# Patient Record
Sex: FEMALE | Race: WHITE | URBAN - METROPOLITAN AREA
[De-identification: names, ages, dates, MRNs, and addresses within clinical notes are randomized per-mention and may not be internally consistent; named-entity substitution may affect disease eponyms.]

---

## 2020-01-01 ENCOUNTER — TELEPHONE (OUTPATIENT)
Dept: INTERNAL MEDICINE CLINIC | Age: 40
End: 2020-01-01

## 2020-01-01 ENCOUNTER — OFFICE VISIT (OUTPATIENT)
Dept: INTERNAL MEDICINE CLINIC | Age: 40
End: 2020-01-01
Payer: COMMERCIAL

## 2020-01-01 ENCOUNTER — HOSPITAL ENCOUNTER (OUTPATIENT)
Age: 40
Setting detail: SPECIMEN
Discharge: HOME | End: 2020-11-24
Payer: COMMERCIAL

## 2020-01-01 ENCOUNTER — OFFICE VISIT (OUTPATIENT)
Dept: PRIMARY CARE CLINIC | Age: 40
End: 2020-01-01
Payer: COMMERCIAL

## 2020-01-01 VITALS
BODY MASS INDEX: 38.2 KG/M2 | WEIGHT: 215.6 LBS | DIASTOLIC BLOOD PRESSURE: 96 MMHG | HEART RATE: 111 BPM | SYSTOLIC BLOOD PRESSURE: 126 MMHG | TEMPERATURE: 98.2 F | HEIGHT: 63 IN | OXYGEN SATURATION: 98 %

## 2020-01-01 VITALS — OXYGEN SATURATION: 97 % | TEMPERATURE: 97.1 F | HEART RATE: 109 BPM | RESPIRATION RATE: 17 BRPM

## 2020-01-01 LAB
SARS-COV-2: NOT DETECTED
SOURCE: NORMAL

## 2020-01-01 PROCEDURE — 99213 OFFICE O/P EST LOW 20 MIN: CPT | Performed by: NURSE PRACTITIONER

## 2020-01-01 PROCEDURE — 99385 PREV VISIT NEW AGE 18-39: CPT | Performed by: FAMILY MEDICINE

## 2020-01-01 PROCEDURE — U0002 COVID-19 LAB TEST NON-CDC: HCPCS

## 2020-01-01 RX ORDER — ESCITALOPRAM OXALATE 10 MG/1
TABLET ORAL
Qty: 30 TABLET | Refills: 1 | Status: SHIPPED | OUTPATIENT
Start: 2020-01-01 | End: 2020-01-01 | Stop reason: SDUPTHER

## 2020-01-01 RX ORDER — DESOGESTREL AND ETHINYL ESTRADIOL 0.15-0.03
1 KIT ORAL DAILY
COMMUNITY
Start: 2020-01-01

## 2020-01-01 RX ORDER — ESCITALOPRAM OXALATE 10 MG/1
10 TABLET ORAL DAILY
Qty: 30 TABLET | Refills: 1 | Status: SHIPPED | OUTPATIENT
Start: 2020-01-01 | End: 2020-01-01

## 2020-01-01 RX ORDER — ESCITALOPRAM OXALATE 10 MG/1
TABLET ORAL
Qty: 90 TABLET | Refills: 0 | Status: SHIPPED | OUTPATIENT
Start: 2020-01-01 | End: 2021-01-01 | Stop reason: SDUPTHER

## 2020-01-01 ASSESSMENT — ENCOUNTER SYMPTOMS
BLOOD IN STOOL: 0
EYES NEGATIVE: 1
WHEEZING: 0
BACK PAIN: 0
SHORTNESS OF BREATH: 0
COUGH: 0
DIARRHEA: 0
NAUSEA: 0
CHEST TIGHTNESS: 0
ABDOMINAL PAIN: 0
CONSTIPATION: 0

## 2020-01-01 ASSESSMENT — PATIENT HEALTH QUESTIONNAIRE - PHQ9
1. LITTLE INTEREST OR PLEASURE IN DOING THINGS: 0
SUM OF ALL RESPONSES TO PHQ9 QUESTIONS 1 & 2: 0
2. FEELING DOWN, DEPRESSED OR HOPELESS: 0
SUM OF ALL RESPONSES TO PHQ QUESTIONS 1-9: 0
SUM OF ALL RESPONSES TO PHQ QUESTIONS 1-9: 0

## 2020-09-24 NOTE — PROGRESS NOTES
Jonna Schilder  1980  47 y.o.  female    Chief Complaint   Patient presents with    New Patient    Annual Exam         History of Present Illness  Carol Pope is a 47 y.o. female who presents today for new patient visit/annual exam. She has not seen a PCP in a long time. She c/o of anxiety. She states had her thyroid check in the past, but she would like to recheck labs. She has chronic worry about her children. She states her heart can race with anxiety. She monitors with her Apple watch. Anxiety symptoms for 2 years. Denies depression. She has felt tied and has gained weight. Recent rash to her RUE that is improving as the day goes on. No Cp or Sob. Review of Systems   Constitutional: Negative for chills, diaphoresis and fever. HENT: Negative. Eyes: Negative. Respiratory: Negative for cough, chest tightness, shortness of breath and wheezing. Cardiovascular: Negative for chest pain and palpitations. Gastrointestinal: Negative for abdominal pain, blood in stool, constipation, diarrhea and nausea. Genitourinary: Negative for difficulty urinating and dysuria. Musculoskeletal: Positive for arthralgias (feet). Negative for back pain and neck pain. Skin: Positive for rash. Neurological: Negative for dizziness, light-headedness and headaches.    Psychiatric/Behavioral:        Anxiety       No Known Allergies    Past Medical History:   Diagnosis Date    Anxiety        Past Surgical History:   Procedure Laterality Date    FOOT SURGERY Left     bunion       Family History   Adopted: Yes   Problem Relation Age of Onset    Glaucoma Mother     Diabetes Father        Social History     Tobacco Use    Smoking status: Former Smoker     Packs/day: 0.25     Years: 6.00     Pack years: 1.50     Types: Cigarettes     Start date: 1996     Last attempt to quit: 2002     Years since quittin.7    Smokeless tobacco: Never Used   Substance Use Topics    Alcohol use: No    Drug use: No       Current Outpatient Medications   Medication Sig Dispense Refill    escitalopram (LEXAPRO) 10 MG tablet Take 1 tablet by mouth daily 30 tablet 1    APRI 0.15-30 MG-MCG per tablet Take 1 tablet by mouth daily       No current facility-administered medications for this visit. OBJECTIVE:    BP (!) 126/96   Pulse 111   Temp 98.2 °F (36.8 °C)   Ht 5' 3\" (1.6 m)   Wt 215 lb 9.6 oz (97.8 kg)   SpO2 98%   BMI 38.19 kg/m²  Pulse recheck 99    Physical Exam  Vitals signs reviewed. Constitutional:       General: She is not in acute distress. Appearance: She is well-developed. HENT:      Right Ear: Tympanic membrane normal.      Left Ear: Tympanic membrane normal.      Nose: Nose normal.      Mouth/Throat:      Mouth: Mucous membranes are moist.   Eyes:      Extraocular Movements: Extraocular movements intact. Pupils: Pupils are equal, round, and reactive to light. Neck:      Musculoskeletal: Neck supple. Cardiovascular:      Rate and Rhythm: Normal rate and regular rhythm. Pulmonary:      Effort: Pulmonary effort is normal. No respiratory distress. Breath sounds: Normal breath sounds. Abdominal:      General: Bowel sounds are normal. There is no distension. Palpations: Abdomen is soft. Musculoskeletal: Normal range of motion. Right lower leg: No edema. Left lower leg: No edema. Skin:     Comments: Urticaria to  RUE   Neurological:      Mental Status: She is alert and oriented to person, place, and time. Cranial Nerves: No cranial nerve deficit. Psychiatric:         Mood and Affect: Mood normal.         ASSESSMENT:  1. Annual physical exam    2. Anxiety    3. Fatigue, unspecified type    4.  Urticaria        PLAN:    Orders Placed This Encounter   Procedures    CBC Auto Differential    Comprehensive Metabolic Panel    TSH WITH REFLEX TO FT4       Orders Placed This Encounter   Medications    escitalopram (LEXAPRO) 10 MG tablet     Sig: Take 1 tablet by mouth daily     Dispense:  30 tablet     Refill:  3   Obtain old records  Obtain labs  Start Lexapro  ADR's explained  Use Cortisone 10 bid to affected areas on extremities for 1-2 weeks if needed  Keep f/u with GYN for pap  The patient is asked to make an attempt to improve diet and exercise patterns to aid in medical management of this problem. Persist RTO or call           Return in about 2 months (around 11/24/2020) for anxiety.     Electronically Signed by Osmar Osman DO

## 2020-10-03 PROBLEM — F41.9 ANXIETY: Status: ACTIVE | Noted: 2020-01-01

## 2020-10-09 NOTE — TELEPHONE ENCOUNTER
Spoke to pt. Mail lab orders placed today  . Pt seen at urgent care for increased hives--treated with steroid rosanna and improved. Stable on  Lexapro.  She will continue to monitor HR and let me know if any problems

## 2020-11-18 NOTE — TELEPHONE ENCOUNTER
Pt called in stating she was having issues with hives, lip swelling, and tongue swelling. Returned pt call and advised to go to urgent care as the doctor would want to see her and does not have any openings at this time. Pt voiced understanding.

## 2020-11-23 NOTE — TELEPHONE ENCOUNTER
Is under quarantine and cannot get her in till 1/4, said medication is working for her.  Please call

## 2020-11-24 NOTE — PATIENT INSTRUCTIONS
Your COVID 19 test can take 3-5 days for the results come back. We ask that you make a Mychart page and view your test results this way. You will need to Self quarantine until you know your results. Increase fluids rest  Saline nasal spray as directed  Warm salt gargles for throat discomfort  Monitor temperature twice a day  Tylenol for fevers and/or discomfort. If symptoms are worse -Go to the ER. Follow up with your primary doctor    To Whom it May Concern:    Dash Garber has been tested for COVID on 11/24/20. They may NOT return to work until their lab test results back and they been fever free for 3 days. If test is positive they must stay home for 2 weeks or until they test negative or as directed by the [facility] Department. Steps to help prevent the spread of COVID-19 if you are sick  SOURCE - https://yurialive.cnlombardi.info/. html     Stay home except to get medical care   ; Stay home: People who are mildly ill with COVID-19 are able to isolate at home during their illness. You should restrict activities outside your home, except for getting medical care.   ; Avoid public areas: Do not go to work, school, or public areas.   ; Avoid public transportation: Avoid using public transportation, ride-sharing, or taxis.  ; Separate yourself from other people and animals in your home   ; Stay away from others: As much as possible, you should stay in a specific room and away from other people in your home. Also, you should use a separate bathroom, if available.   ; Limit contact with pets & animals: You should restrict contact with pets and other animals while you are sick with COVID-19, just like you would around other people. Although there have not been reports of pets or other animals becoming sick with COVID-19, it is still recommended that people sick with COVID-19 limit contact with animals until more information is known about the virus.    ; When possible, have another member of your household care for your animals while you are sick. If you are sick with COVID-19, avoid contact with your pet, including petting, snuggling, being kissed or licked, and sharing food. If you must care for your pet or be around animals while you are sick, wash your hands before and after you interact with pets and wear a facemask. See COVID-19 and Animals for more information. Other considerations   The ill person should eat/be fed in their room if possible. Non-disposable  items used should be handled with gloves and washed with hot water or in a . Clean hands after handling used  items.  If possible, dedicate a lined trash can for the ill person. Use gloves when removing garbage bags, handling, and disposing of trash. Wash hands after handling or disposing of trash.  Consider consulting with your local health department about trash disposal guidance if available. Information for Household Members and Caregivers of Someone who is Sick   Call ahead before visiting your doctor   Call ahead: If you have a medical appointment, call the healthcare provider and tell them that you have or may have COVID-19. This will help the healthcare provider's office take steps to keep other people from getting infected or exposed. Wear a facemask if you are sick   ; If you are sick: You should wear a facemask when you are around other people (e.g., sharing a room or vehicle) or pets and before you enter a healthcare provider's office. ; If you are caring for others: If the person who is sick is not able to wear a facemask (for example, because it causes trouble breathing), then people who live with the person who is sick should not stay in the same room with them, or they should wear a facemask if they enter a room with the person who is sick. Cover your coughs and sneezes   ;  Cover: Cover your mouth and nose with a tissue when you cough or fluids: Also, clean any surfaces that may have blood, stool, or body fluids on them.   ; Household : Use a household cleaning spray or wipe, according to the label instructions. Labels contain instructions for safe and effective use of the cleaning product including precautions you should take when applying the product, such as wearing gloves and making sure you have good ventilation during use of the product. Monitor your symptoms   Seek medical attention: Seek prompt medical attention if your illness is worsening     (e.g., difficulty breathing).   ; Call your doctor: Before seeking care, call your healthcare provider and tell them that you have, or are being evaluated for, COVID-19.   ; Wear a facemask when sick: Put on a facemask before you enter the facility. These steps will help the healthcare provider's office to keep other people in the office or waiting room from getting infected or exposed. ; Alert health department: Ask your healthcare provider to call the local or state health department. Persons who are placed under active monitoring or facilitated self-monitoring should follow instructions provided by their local health department or occupational health professionals, as appropriate.  ; Call 911 if you have a medical emergency: If you have a medical emergency and need to call 911, notify the dispatch personnel that you have, or are being evaluated for COVID-19. If possible, put on a facemask before emergency medical services arrive.

## 2020-11-24 NOTE — PROGRESS NOTES
11/24/2020    HPI:  Chief complaint and history of present illness as per medical assistant/nurse documented today in the Flu/COVID-19 clinic. She is being seen today for a 3 day history of productive cough, nasal congestion, headache, and no sense of taste or smell. Denies sob, difficulty breathing, dizziness, weakness, and no acute distress noted on exam.  Her 23year old son who lives in her home has tested positive and is symptomatic with COVID-19. MEDICATIONS:  Prior to Visit Medications    Medication Sig Taking? Authorizing Provider   escitalopram (LEXAPRO) 10 MG tablet TAKE 1 TABLET BY MOUTH EVERY DAY  Alba Ortiz DO   APRI 0.15-30 MG-MCG per tablet Take 1 tablet by mouth daily  Historical Provider, MD           PHYSICAL EXAM:  Physical Exam  Vitals signs and nursing note reviewed. Constitutional:       General: She is not in acute distress. Appearance: Normal appearance. She is well-developed. She is not ill-appearing, toxic-appearing or diaphoretic. HENT:      Head: Normocephalic and atraumatic. Right Ear: Tympanic membrane, ear canal and external ear normal.      Left Ear: Tympanic membrane, ear canal and external ear normal.      Nose: Congestion present. Mouth/Throat:      Mouth: Mucous membranes are moist.      Pharynx: No oropharyngeal exudate or posterior oropharyngeal erythema. Eyes:      Extraocular Movements: Extraocular movements intact. Conjunctiva/sclera: Conjunctivae normal.      Pupils: Pupils are equal, round, and reactive to light. Neck:      Musculoskeletal: Full passive range of motion without pain, normal range of motion and neck supple. No neck rigidity or muscular tenderness. Thyroid: No thyroid mass or thyromegaly. Trachea: Trachea normal.   Cardiovascular:      Rate and Rhythm: Normal rate and regular rhythm. Pulses: Normal pulses.       Heart sounds: S1 normal and S2 normal.   Pulmonary:      Effort: Pulmonary effort is normal. No accessory muscle usage or respiratory distress. Breath sounds: Normal breath sounds. No stridor. No wheezing, rhonchi or rales. Musculoskeletal: Normal range of motion. Right shoulder: She exhibits normal range of motion, no tenderness, no bony tenderness, no swelling, no crepitus, no deformity and no pain. Lymphadenopathy:      Cervical: No cervical adenopathy. Skin:     General: Skin is warm and dry. Coloration: Skin is not pale. Findings: No erythema or rash. Nails: There is no clubbing. Neurological:      Mental Status: She is alert and oriented to person, place, and time. Psychiatric:         Mood and Affect: Mood normal.         Speech: Speech normal.         Behavior: Behavior normal.         Thought Content: Thought content normal.         Judgment: Judgment normal.         ASSESSMENT/PLAN:  1. Chest congestion    - COVID-19 Ambulatory      FOLLOW-UP:  Return if symptoms worsen or fail to improve. Your COVID 19 test can take 3-5 days for the results come back. We ask that you make a Mychart page and view your test results this way. You will need to Self quarantine until you know your results. Increase fluids rest  Saline nasal spray as directed  Warm salt gargles for throat discomfort  Monitor temperature twice a day  Tylenol for fevers and/or discomfort. If symptoms are worse -Go to the ER. Follow up with your primary doctor    To Whom it May Concern:    Mu Posada has been tested for COVID on 11/24/20. They may NOT return to work until their lab test results back and they been fever free for 3 days. If test is positive they must stay home for 2 weeks or until they test negative or as directed by the [facility] Department.    In addition to other information, the printed after visit summary provided to the patient includes:  [x] COVID-19 Self care instructions  [x] COVID-19 General patient information

## 2020-11-24 NOTE — PROGRESS NOTES
11/24/20  Carol Cleveland  1980    FLU/COVID-19 CLINIC EVALUATION    HPI SYMPTOMS:Son + Covid  Employer: [employer]    [] Fevers  [] Chills  [x] Cough  [] Coughing up blood  [] Chest Congestion  [x] Nasal Congestion  [] Feeling short of breath  [] Sometimes  [] Frequently  [] All the time  [] Chest pain  [x] Headaches  []Tolerable  [x] Severe  [] Sore throat  [] Muscle aches  [] Nausea  [] Vomiting  []Unable to keep fluids down  [x] Diarrhea  []Severe    [] OTHER SYMPTOMS:      Symptom Duration:   [] 1  [] 2   [x] 3   [] 4    [] 5   [] 6   [] 7   [] 8   [] 9   [] 10   [] 11   [] 12   [] 13   [] 14   [] Longer than 14 days    Symptom course:   [] Worsening     [x] Stable     [] Improving    RISK FACTORS:    [] Pregnant or possibly pregnant  [] Age over 61  [] Diabetes  [] Heart disease  [] Asthma  [] COPD/Other chronic lung diseases  [] Active Cancer  [] On Chemotherapy  [] Taking oral steroids  [] History Lymphoma/Leukemia  [x] Close contact with a lab confirmed COVID-19 patient within 14 days of symptom onset  [] History of travel from affected geographical areas within 14 days of symptom onset       VITALS:  There were no vitals filed for this visit. TESTS:    POCT FLU:  [] Positive     []Negative    ASSESSMENT:    [] Flu  [] Possible COVID-19  [] Strep    PLAN:    [] Discharge home with written instructions for:  [] Flu management  [] Possible COVID-19 infection with self-quarantine and management of symptoms  [] Follow-up with primary care physician or emergency department if worsens  [] Evaluation per physician/NP/PA in clinic  [] Sent to ER       An  electronic signature was used to authenticate this note.      --Carmita Rangel LPN on 13/57/1380 at 11:12 AM

## 2021-01-01 ENCOUNTER — INITIAL CONSULT (OUTPATIENT)
Dept: ONCOLOGY | Age: 41
End: 2021-01-01

## 2021-01-01 ENCOUNTER — PATIENT MESSAGE (OUTPATIENT)
Dept: INTERNAL MEDICINE CLINIC | Age: 41
End: 2021-01-01

## 2021-01-01 ENCOUNTER — HOSPITAL ENCOUNTER (EMERGENCY)
Age: 41
End: 2021-05-23
Attending: EMERGENCY MEDICINE
Payer: COMMERCIAL

## 2021-01-01 ENCOUNTER — OFFICE VISIT (OUTPATIENT)
Dept: INTERNAL MEDICINE CLINIC | Age: 41
End: 2021-01-01
Payer: COMMERCIAL

## 2021-01-01 ENCOUNTER — OFFICE VISIT (OUTPATIENT)
Dept: ONCOLOGY | Age: 41
End: 2021-01-01

## 2021-01-01 ENCOUNTER — HOSPITAL ENCOUNTER (OUTPATIENT)
Dept: INFUSION THERAPY | Age: 41
Discharge: HOME | End: 2021-04-06
Payer: COMMERCIAL

## 2021-01-01 ENCOUNTER — HOSPITAL ENCOUNTER (OUTPATIENT)
Dept: INFUSION THERAPY | Age: 41
Discharge: HOME | End: 2021-04-22
Payer: COMMERCIAL

## 2021-01-01 VITALS
HEART RATE: 114 BPM | DIASTOLIC BLOOD PRESSURE: 92 MMHG | TEMPERATURE: 98.5 F | WEIGHT: 221.8 LBS | SYSTOLIC BLOOD PRESSURE: 162 MMHG | OXYGEN SATURATION: 94 % | HEIGHT: 63 IN | RESPIRATION RATE: 18 BRPM | BODY MASS INDEX: 39.3 KG/M2

## 2021-01-01 VITALS
OXYGEN SATURATION: 99 % | HEART RATE: 92 BPM | DIASTOLIC BLOOD PRESSURE: 88 MMHG | TEMPERATURE: 97.1 F | SYSTOLIC BLOOD PRESSURE: 132 MMHG | BODY MASS INDEX: 38.62 KG/M2 | WEIGHT: 218 LBS

## 2021-01-01 VITALS — DIASTOLIC BLOOD PRESSURE: 39 MMHG | RESPIRATION RATE: 102 BRPM | HEART RATE: 46 BPM | SYSTOLIC BLOOD PRESSURE: 57 MMHG

## 2021-01-01 VITALS
HEART RATE: 103 BPM | BODY MASS INDEX: 39.23 KG/M2 | DIASTOLIC BLOOD PRESSURE: 76 MMHG | SYSTOLIC BLOOD PRESSURE: 152 MMHG | WEIGHT: 221.4 LBS | TEMPERATURE: 98.4 F | OXYGEN SATURATION: 100 % | HEIGHT: 63 IN

## 2021-01-01 DIAGNOSIS — R22.0 LIP SWELLING: ICD-10-CM

## 2021-01-01 DIAGNOSIS — D72.829 LEUKOCYTOSIS, UNSPECIFIED TYPE: Primary | ICD-10-CM

## 2021-01-01 DIAGNOSIS — D72.829 LEUKOCYTOSIS, UNSPECIFIED TYPE: ICD-10-CM

## 2021-01-01 DIAGNOSIS — L50.9 URTICARIA: ICD-10-CM

## 2021-01-01 DIAGNOSIS — I46.9 CARDIAC ARREST (HCC): Primary | ICD-10-CM

## 2021-01-01 DIAGNOSIS — F41.9 ANXIETY: Primary | ICD-10-CM

## 2021-01-01 LAB
DIFFERENTIAL TYPE: ABNORMAL
FERRITIN: 62 NG/ML (ref 15–150)
HCT VFR BLD CALC: 37.3 % (ref 37–47)
HEMOGLOBIN: 12.4 GM/DL (ref 12.5–16)
IRON: 56 UG/DL (ref 37–145)
LYMPHOCYTES ABSOLUTE: 2.7 K/CU MM
LYMPHOCYTES RELATIVE PERCENT: 20 % (ref 24–44)
MCH RBC QN AUTO: 28.3 PG (ref 27–31)
MCHC RBC AUTO-ENTMCNC: 33.2 % (ref 32–36)
MCV RBC AUTO: 85.2 FL (ref 78–100)
MONOCYTES ABSOLUTE: 0.9 K/CU MM
MONOCYTES RELATIVE PERCENT: 7 % (ref 0–4)
PCT TRANSFERRIN: 14 % (ref 10–44)
PDW BLD-RTO: 13.6 % (ref 11.7–14.9)
PLATELET # BLD: 561 K/CU MM (ref 140–440)
PMV BLD AUTO: 9.3 FL (ref 7.5–11.1)
RBC # BLD: 4.38 M/CU MM (ref 4.2–5.4)
RBC # BLD: ABNORMAL 10*6/UL
SEGMENTED NEUTROPHILS ABSOLUTE COUNT: 9.8 K/CU MM
SEGMENTED NEUTROPHILS RELATIVE PERCENT: 73 % (ref 36–66)
TOTAL IRON BINDING CAPACITY: 405 UG/DL (ref 250–450)
UNSATURATED IRON BINDING CAPACITY: 349 UG/DL (ref 110–370)
WBC # BLD: 13.4 K/CU MM (ref 4–10.5)

## 2021-01-01 PROCEDURE — 85027 COMPLETE CBC AUTOMATED: CPT

## 2021-01-01 PROCEDURE — 96368 THER/DIAG CONCURRENT INF: CPT

## 2021-01-01 PROCEDURE — 85007 BL SMEAR W/DIFF WBC COUNT: CPT

## 2021-01-01 PROCEDURE — 99211 OFF/OP EST MAY X REQ PHY/QHP: CPT

## 2021-01-01 PROCEDURE — 31500 INSERT EMERGENCY AIRWAY: CPT

## 2021-01-01 PROCEDURE — 92950 HEART/LUNG RESUSCITATION CPR: CPT

## 2021-01-01 PROCEDURE — 6360000002 HC RX W HCPCS

## 2021-01-01 PROCEDURE — 2500000003 HC RX 250 WO HCPCS: Performed by: EMERGENCY MEDICINE

## 2021-01-01 PROCEDURE — 99213 OFFICE O/P EST LOW 20 MIN: CPT | Performed by: INTERNAL MEDICINE

## 2021-01-01 PROCEDURE — 2500000003 HC RX 250 WO HCPCS

## 2021-01-01 PROCEDURE — 83540 ASSAY OF IRON: CPT

## 2021-01-01 PROCEDURE — 96375 TX/PRO/DX INJ NEW DRUG ADDON: CPT

## 2021-01-01 PROCEDURE — 99284 EMERGENCY DEPT VISIT MOD MDM: CPT

## 2021-01-01 PROCEDURE — 36415 COLL VENOUS BLD VENIPUNCTURE: CPT

## 2021-01-01 PROCEDURE — 6360000002 HC RX W HCPCS: Performed by: EMERGENCY MEDICINE

## 2021-01-01 PROCEDURE — 2580000003 HC RX 258

## 2021-01-01 PROCEDURE — 2580000003 HC RX 258: Performed by: EMERGENCY MEDICINE

## 2021-01-01 PROCEDURE — 93005 ELECTROCARDIOGRAM TRACING: CPT | Performed by: EMERGENCY MEDICINE

## 2021-01-01 PROCEDURE — 99202 OFFICE O/P NEW SF 15 MIN: CPT

## 2021-01-01 PROCEDURE — 99204 OFFICE O/P NEW MOD 45 MIN: CPT | Performed by: INTERNAL MEDICINE

## 2021-01-01 PROCEDURE — 99213 OFFICE O/P EST LOW 20 MIN: CPT | Performed by: FAMILY MEDICINE

## 2021-01-01 PROCEDURE — 96365 THER/PROPH/DIAG IV INF INIT: CPT

## 2021-01-01 PROCEDURE — 83550 IRON BINDING TEST: CPT

## 2021-01-01 PROCEDURE — 82728 ASSAY OF FERRITIN: CPT

## 2021-01-01 RX ORDER — EPINEPHRINE 0.1 MG/ML
SYRINGE (ML) INJECTION DAILY PRN
Status: COMPLETED | OUTPATIENT
Start: 2021-01-01 | End: 2021-01-01

## 2021-01-01 RX ORDER — ESCITALOPRAM OXALATE 10 MG/1
TABLET ORAL
Qty: 90 TABLET | Refills: 1 | Status: SHIPPED | OUTPATIENT
Start: 2021-01-01 | End: 2021-01-01

## 2021-01-01 RX ORDER — SODIUM CHLORIDE 9 MG/ML
INJECTION, SOLUTION INTRAVENOUS CONTINUOUS PRN
Status: COMPLETED | OUTPATIENT
Start: 2021-01-01 | End: 2021-01-01

## 2021-01-01 RX ORDER — M-VIT,TX,IRON,MINS/CALC/FOLIC 27MG-0.4MG
1 TABLET ORAL DAILY
COMMUNITY

## 2021-01-01 RX ORDER — CALCIUM CHLORIDE 100 MG/ML
INJECTION INTRAVENOUS; INTRAVENTRICULAR DAILY PRN
Status: COMPLETED | OUTPATIENT
Start: 2021-01-01 | End: 2021-01-01

## 2021-01-01 RX ORDER — HYDROXYZINE HYDROCHLORIDE 10 MG/1
TABLET, FILM COATED ORAL
Qty: 90 TABLET | Refills: 1 | Status: SHIPPED | OUTPATIENT
Start: 2021-01-01

## 2021-01-01 RX ORDER — DEXTROSE MONOHYDRATE 25 G/50ML
INJECTION, SOLUTION INTRAVENOUS DAILY PRN
Status: COMPLETED | OUTPATIENT
Start: 2021-01-01 | End: 2021-01-01

## 2021-01-01 RX ORDER — NALOXONE HYDROCHLORIDE 1 MG/ML
INJECTION INTRAMUSCULAR; INTRAVENOUS; SUBCUTANEOUS DAILY PRN
Status: COMPLETED | OUTPATIENT
Start: 2021-01-01 | End: 2021-01-01

## 2021-01-01 RX ORDER — HYDROXYZINE HYDROCHLORIDE 10 MG/1
10 TABLET, FILM COATED ORAL 3 TIMES DAILY PRN
Qty: 90 TABLET | Refills: 1 | Status: SHIPPED | OUTPATIENT
Start: 2021-01-01 | End: 2021-01-01

## 2021-01-01 RX ORDER — ATROPINE SULFATE 0.1 MG/ML
INJECTION INTRAVENOUS DAILY PRN
Status: COMPLETED | OUTPATIENT
Start: 2021-01-01 | End: 2021-01-01

## 2021-01-01 RX ORDER — AMIODARONE HYDROCHLORIDE 50 MG/ML
INJECTION, SOLUTION INTRAVENOUS DAILY PRN
Status: COMPLETED | OUTPATIENT
Start: 2021-01-01 | End: 2021-01-01

## 2021-01-01 RX ADMIN — EPINEPHRINE 1 MG: 0.1 INJECTION, SOLUTION ENDOTRACHEAL; INTRACARDIAC; INTRAVENOUS at 01:43

## 2021-01-01 RX ADMIN — CALCIUM CHLORIDE 1000 MG: 100 INJECTION, SOLUTION INTRAVENOUS; INTRAVENTRICULAR at 01:23

## 2021-01-01 RX ADMIN — Medication 50 MEQ: at 01:23

## 2021-01-01 RX ADMIN — NALOXONE HYDROCHLORIDE 4 MG: 1 INJECTION PARENTERAL at 01:21

## 2021-01-01 RX ADMIN — EPINEPHRINE 1 MG: 0.1 INJECTION, SOLUTION ENDOTRACHEAL; INTRACARDIAC; INTRAVENOUS at 01:20

## 2021-01-01 RX ADMIN — ATROPINE SULFATE 1 MG: 0.1 INJECTION PARENTERAL at 01:29

## 2021-01-01 RX ADMIN — NALOXONE HYDROCHLORIDE 2 MG: 1 INJECTION PARENTERAL at 01:27

## 2021-01-01 RX ADMIN — DEXTROSE MONOHYDRATE 25 G: 25 INJECTION, SOLUTION INTRAVENOUS at 01:15

## 2021-01-01 RX ADMIN — EPINEPHRINE 1 MG: 0.1 INJECTION, SOLUTION ENDOTRACHEAL; INTRACARDIAC; INTRAVENOUS at 01:36

## 2021-01-01 RX ADMIN — EPINEPHRINE 1 MG: 0.1 INJECTION, SOLUTION ENDOTRACHEAL; INTRACARDIAC; INTRAVENOUS at 01:26

## 2021-01-01 RX ADMIN — NALOXONE HYDROCHLORIDE 2 MG: 1 INJECTION PARENTERAL at 01:15

## 2021-01-01 RX ADMIN — Medication 50 MEQ: at 01:45

## 2021-01-01 RX ADMIN — EPINEPHRINE 1 MG: 0.1 INJECTION, SOLUTION ENDOTRACHEAL; INTRACARDIAC; INTRAVENOUS at 01:15

## 2021-01-01 RX ADMIN — EPINEPHRINE 1 MG: 0.1 INJECTION, SOLUTION ENDOTRACHEAL; INTRACARDIAC; INTRAVENOUS at 01:23

## 2021-01-01 RX ADMIN — AMIODARONE HYDROCHLORIDE 300 MG: 50 INJECTION, SOLUTION INTRAVENOUS at 01:21

## 2021-01-01 RX ADMIN — Medication 50 MEQ: at 01:18

## 2021-01-01 RX ADMIN — Medication 5 MCG/MIN: at 01:36

## 2021-01-01 RX ADMIN — EPINEPHRINE 1 MG: 0.1 INJECTION, SOLUTION ENDOTRACHEAL; INTRACARDIAC; INTRAVENOUS at 01:17

## 2021-01-01 RX ADMIN — EPINEPHRINE 1 MG: 0.1 INJECTION, SOLUTION ENDOTRACHEAL; INTRACARDIAC; INTRAVENOUS at 01:39

## 2021-01-01 RX ADMIN — EPINEPHRINE 5 MCG/MIN: 1 INJECTION, SOLUTION, CONCENTRATE INTRAVENOUS at 01:37

## 2021-01-01 RX ADMIN — SODIUM CHLORIDE 1000 ML: 9 INJECTION, SOLUTION INTRAVENOUS at 01:15

## 2021-01-01 ASSESSMENT — PATIENT HEALTH QUESTIONNAIRE - PHQ9
SUM OF ALL RESPONSES TO PHQ QUESTIONS 1-9: 0
SUM OF ALL RESPONSES TO PHQ9 QUESTIONS 1 & 2: 0
SUM OF ALL RESPONSES TO PHQ QUESTIONS 1-9: 0
SUM OF ALL RESPONSES TO PHQ QUESTIONS 1-9: 0
2. FEELING DOWN, DEPRESSED OR HOPELESS: 0

## 2021-01-01 ASSESSMENT — ENCOUNTER SYMPTOMS
CHEST TIGHTNESS: 0
NAUSEA: 0
SHORTNESS OF BREATH: 0
ABDOMINAL PAIN: 0
BACK PAIN: 0
CONSTIPATION: 0
COUGH: 0
DIARRHEA: 0

## 2021-01-04 NOTE — PROGRESS NOTES
Clemente Lindsay  1980  47 y.o.  female    Chief Complaint   Patient presents with    3 Month Follow-Up     2 months f/u anxiety doing good         History of Present Illness  Carol Munoz is a 47 y.o. female who presents today for a check up. Last labs reviewed. Pt has anxiety and she is doing better on the Lexapro. Denies depression or SE.  -Urticaria- No symptoms today. First episode started on September and second in Nov.  She was seen by urgent care and started on steroids. No known trigger or cause. She has pictures that reveal large hives to UE. She had an episode of lower lip swelling and tongue swelling. She did not have trouble swallowing. No Cp or Sob. Review of Systems   Constitutional: Negative for diaphoresis and fever. HENT: Negative. Respiratory: Negative for cough, chest tightness and shortness of breath. Cardiovascular: Negative for chest pain and palpitations. Gastrointestinal: Negative for abdominal pain, constipation, diarrhea and nausea. Genitourinary: Negative for difficulty urinating. Musculoskeletal: Negative for back pain. Neurological: Negative for dizziness and headaches. Psychiatric/Behavioral: Negative for dysphoric mood.        No Known Allergies    Past Medical History:   Diagnosis Date    Anxiety        Past Surgical History:   Procedure Laterality Date    FOOT SURGERY Left     bunion       Family History   Adopted: Yes   Problem Relation Age of Onset    Glaucoma Mother     Diabetes Father        Social History     Tobacco Use    Smoking status: Former Smoker     Packs/day: 0.25     Years: 6.00     Pack years: 1.50     Types: Cigarettes     Start date: 1996     Quit date: 2002     Years since quittin.9    Smokeless tobacco: Never Used   Substance Use Topics    Alcohol use: No    Drug use: No       Current Outpatient Medications   Medication Sig Dispense Refill  escitalopram (LEXAPRO) 10 MG tablet TAKE 1 TABLET BY MOUTH EVERY DAY 90 tablet 1    APRI 0.15-30 MG-MCG per tablet Take 1 tablet by mouth daily       No current facility-administered medications for this visit. OBJECTIVE:    /88   Pulse 92   Temp 97.1 °F (36.2 °C)   Wt 218 lb (98.9 kg)   SpO2 99%   BMI 38.62 kg/m²     Physical Exam  Vitals signs reviewed. Constitutional:       General: She is not in acute distress. Eyes:      Conjunctiva/sclera: Conjunctivae normal.   Neck:      Musculoskeletal: Neck supple. Cardiovascular:      Rate and Rhythm: Normal rate and regular rhythm. Pulmonary:      Effort: Pulmonary effort is normal. No respiratory distress. Breath sounds: Normal breath sounds. Abdominal:      General: Bowel sounds are normal.      Palpations: Abdomen is soft. Tenderness: There is no abdominal tenderness. Musculoskeletal:      Right lower leg: No edema. Left lower leg: No edema. Skin:     Findings: No rash. Neurological:      Mental Status: She is alert and oriented to person, place, and time. Cranial Nerves: No cranial nerve deficit. Psychiatric:         Mood and Affect: Mood normal.         ASSESSMENT:  1. Anxiety    2. Urticaria    3. Lip swelling        PLAN:    Orders Placed This Encounter   Procedures    External Referral To Allergy       Orders Placed This Encounter   Medications    escitalopram (LEXAPRO) 10 MG tablet     Sig: TAKE 1 TABLET BY MOUTH EVERY DAY     Dispense:  90 tablet     Refill:  1   Last labs reviewed. Pt given copy of additional lab orders to obtan  Continue medications  Take OTC Zyrtec daily  Referral to allergist  Persist RTO or call         Return in about 5 months (around 6/4/2021) for Check up.     Electronically Signed by Alexandra Alford DO

## 2021-04-01 NOTE — PROGRESS NOTES
Patient Name:  Rachel Valle  Patient :  1980  Patient MRN:  [id number]     Primary Oncologist: Chele Alexander MD  Referring Provider: Griffin Lemus DO     Date of Service: 2021      Reason for Consult: Thrombocytosis and leukocytosis. Chief Complaint:    Chief Complaint   Patient presents with    New Patient     Patient Active Problem List:     Anxiety     HPI:   Cassi Arita is a pleasant 46year old  female patient who was referred for thrombocytosis and leukocytosis. I reviewed her records. On 2012 WBC was 18.8, Hg 12, MCV 86.2, and platelet 541. At the time she was in labor. In 2020 WBC was 11.3, Hg 12.5, MCV 87 and platelet 303Y. In 2021 WBC was 11.7, Hg 11.9, MCV 35, platelet 639Y. On 2021 WBC was 10, Hg 12.5, MCV 87, platelet 041M. ANC 7.2. C3 and C4 were normal. TSH was 2.71, Tryptase was 7.4. C1 inhibitor esterase was normal. ESR was normal. CRP was 18H. On 3/11/2021 WBC was 11.7, Hb 12.8, MCV 88, platelet 760C. ANC was 8.3. RF was normal. LDH was 185. Uric acid was 4.5. Sed rate and CRP were normal.    In 2020 she was referred to allergologist for ? Hives which have resolved. She was told that she does not have allergic reaction and recommended to see hematologist or rheumatologist.  She denied any frequent infections or history of splenectomy. She works at [employer] from home. I will order CBC with peripheral smear and workup for MPN, flow cytometry and Iron study. I may consider bone marrow study also. She has regular menstruation. Past Medical History:   Anxiety and depression. IBS. Past Surgery History:   Left foot bunion surgery . Social History:   She started smoking in 1996 and quit in 1794  No EtOH or illicit drug use. She has 2 children. No miscarriage. Family History:  Father has DM. Mother has glaucoma and breast cancer in her 61. Paternal grand mother has lung cancer. Hereditary bunion. No Known Allergies    Current Outpatient Medications on File Prior to Visit   Medication Sig Dispense Refill    Multiple Vitamins-Minerals (THERAPEUTIC MULTIVITAMIN-MINERALS) tablet Take 1 tablet by mouth daily      escitalopram (LEXAPRO) 10 MG tablet TAKE 1 TABLET BY MOUTH EVERY DAY 90 tablet 1    APRI 0.15-30 MG-MCG per tablet Take 1 tablet by mouth daily       No current facility-administered medications on file prior to visit. Review of Systems:    Constitutional:  No weight loss, No fever, No chills, No night sweats. Energy level good. Eyes:  No diplopia, No transient or permanent loss of vision, No scotomata. ENT / Mouth:  No epistaxis, No dysphagia, No hoarseness, No oral ulcers, No gingival bleeding. No sore throat, No postnasal drip, No nasal drip, No mouth pain, No sinus pain, No tinnitus, Normal hearing. Cardiovascular:  No chest pain, No palpitations, No syncope, No upper extremity edema, No lower extremity edema, No calf discomfort. Respiratory:  No cough. No hemoptysis, No pleurisy, No wheezing, No dyspnea. Breast:  No breast mass, No pain, No nipple discharge, No change in size, No change in shape. Gastrointestinal:  No abdominal pain, No abdominal cramping, No nausea, No vomiting, No constipation, No diarrhea, No hematochezia, No melena, No jaundice, No dyspepsia, No dysphagia. Urinary:  No dysuria, No hematuria, No urinary incontinence. Gynecological:  No vaginal discharge, No suprapubic pain, No abnormal vaginal bleeding. (Female Patients Only)  Musculoskeletal:  No muscle pain, No swollen joints, No joint redness, No bone pain, No spine tenderness. Skin:  No rash, No nodules, No pruritus, No lesions. Neurologic:  No confusion, No seizures, No syncope, No tremor, No speech change, No headache, No hiccups, No abnormal gait, No sensory changes, No weakness.   Psychiatric:  No depression, No anxiety, Concentration normal.  Endocrine:  No polyuria, No polydipsia, No hot flashes, No thyroid symptoms. Hematologic:  No epistaxis, No gingival bleeding, No petechiae, No ecchymosis. Lymphatic:  No lymphadenopathy, No lymphedema. Allergy / Immunologic:  No eczema, No frequent mucous infections, No frequent respiratory infections, No recurrent urticarial, No frequent skin infections. Vital Signs: BP (!) 162/92 (Site: Right Upper Arm, Position: Sitting, Cuff Size: Large Adult)   Pulse 114   Temp 98.5 °F (36.9 °C) (Temporal)   Resp 18   Ht 5' 3\" (1.6 m)   Wt 221 lb 12.8 oz (100.6 kg)   SpO2 94%   BMI 39.29 kg/m²      Physical Exam:  CONSTITUTIONAL: awake, alert, cooperative, no apparent distress   EYES: pupils equal, round and reactive to light, sclera clear and conjunctiva normal  ENT: Normocephalic, without obvious abnormality, atraumatic  NECK: supple, symmetrical, no jugular venous distension and no carotid bruits   HEMATOLOGIC/LYMPHATIC: no cervical, supraclavicular or axillary lymphadenopathy   LUNGS: no increased work of breathing and clear to auscultation   CARDIOVASCULAR: regular rate and rhythm, normal S1 and S2, no murmur noted  ABDOMEN: normal bowel sounds x 4, soft, non-distended, non-tender, no masses palpated, no hepatosplenomegaly   MUSCULOSKELETAL: full range of motion noted, tone is normal  NEUROLOGIC: awake, alert, oriented to name, place and time. Motor skills grossly intact. SKIN: Normal skin color, texture, turgor and no jaundice.  appears intact   EXTREMITIES: no LE edema        Labs:  Hematology:  Lab Results   Component Value Date    WBC 13.4 (H) 04/06/2021    RBC 4.38 04/06/2021    HGB 12.4 (L) 04/06/2021    HCT 37.3 04/06/2021    MCV 85.2 04/06/2021    MCH 28.3 04/06/2021    MCHC 33.2 04/06/2021    RDW 13.6 04/06/2021     (H) 04/06/2021    MPV 9.3 04/06/2021    SEGSPCT 73.0 (H) 04/06/2021    EOSRELPCT 0 01/14/2021    BASOPCT 0 01/14/2021    LYMPHOPCT 20.0 (L) 04/06/2021    MONOPCT 7.0 (H) 04/06/2021    SEGSABS 9.8 04/06/2021    EOSABS 0.0 01/14/2021 BASOSABS 0.0 01/14/2021    LYMPHSABS 2.7 04/06/2021    MONOSABS 0.9 04/06/2021    DIFFTYPE MANUAL DIFFERENTIAL 04/06/2021     Chemistry:  Lab Results   Component Value Date     09/26/2020    K 4.4 09/26/2020     09/26/2020    CO2 23 09/26/2020    BUN 14 09/26/2020    CREATININE 0.76 09/26/2020    GLUCOSE 90 09/26/2020    CALCIUM 8.6 (L) 09/26/2020    PROT 6.4 09/26/2020    LABALBU 3.8 09/26/2020    BILITOT 0.4 09/26/2020    ALKPHOS 97 09/26/2020    AST 23 09/26/2020    ALT 24 09/26/2020    LABGLOM 100 09/26/2020    GFRAA 115 09/26/2020    AGRATIO 1.5 09/26/2020    GLOB 2.6 09/26/2020     Immunology:  Lab Results   Component Value Date    PROT 6.4 09/26/2020     Observations:  No data recorded     Assessment & Plan:    1. She leukocytosis and thrombocytosis. I ordered MPN w/u, BCR Abl, flow and Iron study. I may consider bone marrow study. No vasomotor symptoms. 2. I remind her to start mammogram next year. We discussed about healthy diet and life style. RTC in 2 weeks or sooner. All her questions have been answered for today. I have discussed the above stated plan with the patient and they verbalized understanding and agreed with the plan. Thank you for allowing us to participate in this patient's care.       Electronically signed by Andressa Duke MD on 4/1/21 at 7:04 AM EDT

## 2021-04-06 NOTE — PROGRESS NOTES
MA Rooming Questions  Patient: Yoel Nicholson  MRN: [id number]    Date: 4/6/2021        1. Do you have any new issues? no     NP  2. Do you need any refills on medications?    no  5. Did the patient have a depression screening completed today?  No    No data recorded     PHQ-9 Given to (if applicable):               PHQ-9 Score (if applicable):                     [] Positive     []  Negative              Does question #9 need addressed (if applicable)                     [] Yes    []  No               Radha Cadet MA

## 2021-04-09 NOTE — PROGRESS NOTES
Patient Name:  Feliep Mendez  Patient :  1980  Patient MRN:  [id number]     Primary Oncologist: Vadim Bran MD  Referring Provider: Matteo Gonzalez DO     Date of Service: 2021      Reason for Consult: Thrombocytosis and leukocytosis. Chief Complaint:    Chief Complaint   Patient presents with    Follow-up     Patient Active Problem List:     Anxiety     HPI:   Cody Hall is a pleasant 46year old  female patient who was referred for thrombocytosis and leukocytosis. I reviewed her records. On 2012 WBC was 18.8, Hg 12, MCV 86.2, and platelet 432. At the time she was in labor. In 2020 WBC was 11.3, Hg 12.5, MCV 87 and platelet 400O. In 2021 WBC was 11.7, Hg 11.9, MCV 35, platelet 142W. On 2021 WBC was 10, Hg 12.5, MCV 87, platelet 643T. ANC 7.2. C3 and C4 were normal. TSH was 2.71, Tryptase was 7.4. C1 inhibitor esterase was normal. ESR was normal. CRP was 18H. On 3/11/2021 WBC was 11.7, Hb 12.8, MCV 88, platelet 223X. ANC was 8.3. RF was normal. LDH was 185. Uric acid was 4.5. Sed rate and CRP were normal.    In 2020 she was referred to allergologist for ? Hives which have resolved. She was told that she does not have allergic reaction and recommended to see hematologist or rheumatologist.  She denied any frequent infections or history of splenectomy. She works at [employer] from home. I will order CBC with peripheral smear and workup for MPN, flow cytometry and Iron study. I may consider bone marrow study also. She has regular menstruation. On 2021 she came in for follow up visit. Oscar-2 with reflex from peripheral blood was negative. Flow cytometry showed no immunophenotypic abnormalities. BCR Abl study from peripheral blood was negative. Iron study was suggestive for low Iron. She will take OTC Iron supplement. She wants to defer bone marrow study. She is agreeable to have repeat CBC and Iron study prior to next OV.   No nausea, vomiting or diarrhea. No fever or chills. No recent infection. No headache or dizziness. No acute pain. Past Medical History:   Anxiety and depression. IBS. Past Surgery History:   Left foot bunion surgery 1995. Social History:   She started smoking in January 1996 and quit in January 3845  No EtOH or illicit drug use. She has 2 children. No miscarriage. Family History:  Father has DM. Mother has glaucoma and breast cancer in her 61. Paternal grand mother has lung cancer. Hereditary bunion. Review of Systems: The remainder of ROS is unremarkable. Vital Signs: BP (!) 152/76 (Site: Right Upper Arm, Position: Sitting, Cuff Size: Large Adult)   Pulse 103   Temp 98.4 °F (36.9 °C) (Temporal)   Ht 5' 3\" (1.6 m)   Wt 221 lb 6.4 oz (100.4 kg)   SpO2 100%   BMI 39.22 kg/m²      Physical Exam:  CONSTITUTIONAL: awake, alert, cooperative, no apparent distress   EYES: pupils equal, round and reactive to light, sclera clear and conjunctiva normal  ENT: Normocephalic, without obvious abnormality, atraumatic  NECK: supple, symmetrical, no jugular venous distension and no carotid bruits   HEMATOLOGIC/LYMPHATIC: no cervical, supraclavicular or axillary lymphadenopathy   LUNGS: no increased work of breathing and clear to auscultation   CARDIOVASCULAR: regular rate and rhythm, normal S1 and S2, no murmur noted  ABDOMEN: normal bowel sounds x 4, soft, non-distended, non-tender, no masses palpated, no hepatosplenomegaly   MUSCULOSKELETAL: full range of motion noted, tone is normal  NEUROLOGIC: awake, alert, oriented to name, place and time. Motor skills grossly intact. CN II - XII are grossly intact. SKIN: Normal skin color, texture, turgor and no jaundice. appears intact   EXTREMITIES: no LE edema or cyanosis.      Labs:  Hematology:  Lab Results   Component Value Date    WBC 13.4 (H) 04/06/2021    RBC 4.38 04/06/2021    HGB 12.4 (L) 04/06/2021    HCT 37.3 04/06/2021    MCV 85.2 04/06/2021    MCH 28.3 04/06/2021    MCHC 33.2 04/06/2021    RDW 13.6 04/06/2021     (H) 04/06/2021    MPV 9.3 04/06/2021    SEGSPCT 73.0 (H) 04/06/2021    EOSRELPCT 0 01/14/2021    BASOPCT 0 01/14/2021    LYMPHOPCT 20.0 (L) 04/06/2021    MONOPCT 7.0 (H) 04/06/2021    SEGSABS 9.8 04/06/2021    EOSABS 0.0 01/14/2021    BASOSABS 0.0 01/14/2021    LYMPHSABS 2.7 04/06/2021    MONOSABS 0.9 04/06/2021    DIFFTYPE MANUAL DIFFERENTIAL 04/06/2021     Chemistry:  Lab Results   Component Value Date     09/26/2020    K 4.4 09/26/2020     09/26/2020    CO2 23 09/26/2020    BUN 14 09/26/2020    CREATININE 0.76 09/26/2020    GLUCOSE 90 09/26/2020    CALCIUM 8.6 (L) 09/26/2020    PROT 6.4 09/26/2020    LABALBU 3.8 09/26/2020    BILITOT 0.4 09/26/2020    ALKPHOS 97 09/26/2020    AST 23 09/26/2020    ALT 24 09/26/2020    LABGLOM 100 09/26/2020    GFRAA 115 09/26/2020    AGRATIO 1.5 09/26/2020    GLOB 2.6 09/26/2020     Immunology:  Lab Results   Component Value Date    PROT 6.4 09/26/2020     Observations:  PHQ-9 Total Score: 0 (4/22/2021  3:18 PM)       Assessment & Plan:    1. She leukocytosis and thrombocytosis. MPN w/u, BCR Abl, flow cytometry study from blood in April 2021 were unremarkable. Iron study suggested of low Iron . She will take daily OTC Iron pill. I will repeat CBC and Iron study prior to next OV. I may consider bone marrow study if platelets and WBC continue to rise. No vasomotor symptoms. 2. I remind her to start mammogram in 2022. We discussed about healthy diet and life style. RTC in 12 weeks or sooner. All her questions have been answered for today.     Electronically signed by Glenda Mccullough MD on 4/1/21 at 7:04 AM EDT

## 2021-04-21 NOTE — TELEPHONE ENCOUNTER
From: Kellie Cleveland  To: Mallory Lott DO  Sent: 4/20/2021 2:29 PM EDT  Subject: Prescription Question    I was wondering if there is anything else you can prescribe to me for my anxiety. It is at a 10 lately going to these specialists and waiting on results. Sergey How been having panic attacks again and cant breathe. Its really affecting my everyday life. Is there something you can prescribe as needed on top of the Lexapro?

## 2021-04-22 NOTE — PROGRESS NOTES
MA Rooming Questions  Patient: Raina Bean  MRN: [id number]    Date: 4/22/2021        1. Do you have any new issues?   no         2. Do you need any refills on medications?    no    3. Have you had any imaging done since your last visit?   no    4. Have you been hospitalized or seen in the emergency room since your last visit here?   no    5. Did the patient have a depression screening completed today?  Yes    PHQ-9 Total Score: 0 (4/22/2021  3:18 PM)       PHQ-9 Given to (if applicable):               PHQ-9 Score (if applicable):                     [] Positive     []  Negative              Does question #9 need addressed (if applicable)                     [] Yes    []  No               Jennifer Tracey MA

## 2021-05-23 NOTE — ED NOTES
EMS reports that family says she had been drinking and took an oxycotin, an hour later pt began vomiting and went unresponsive and apenic     Alpesh Asher RN  05/23/21 0489

## 2021-05-23 NOTE — ED PROVIDER NOTES
[facility]      TRIAGE CHIEF COMPLAINT:   Cardiac Arrest      [place]:  Hazel Felipe is a 46 y.o. female that presents in cardiac arrest.  Patient about 30 minutes prior to arrival apparently was using some OxyContin and drinking alcohol and had nausea vomiting respiratory depression and then cardiac arrest.  CPR started at the scene by family members. EMS put a Adam airway in perform CPR and brought in for evaluation. Patient on arrival is in asystole CPR in progress has emesis in the airway pupils fixed and dilated bilaterally she has been down for about 30 minutes. No further HPI available at this time. Resuscitation in process. [facility] Records     REVIEW OF SYSTEMS:      Review of Systems   Unable to perform ROS: Patient unresponsive       Past Medical History:   Diagnosis Date    Anxiety      Past Surgical History:   Procedure Laterality Date    FOOT SURGERY Left     bunion     Family History   Adopted: Yes   Problem Relation Age of Onset   Cheryl Manual Glaucoma Mother     Breast Cancer Mother     Diabetes Father     High Blood Pressure Father      Social History     Socioeconomic History    Marital status:      Spouse name: Not on file    Number of children: 2    Years of education: Not on file    Highest education level: Not on file   Occupational History    Occupation: Work from home     Employer: [employer]   Tobacco Use    Smoking status: Former Smoker     Packs/day: 0.25     Years: 6.00     Pack years: 1.50     Types: Cigarettes     Start date: 1996     Quit date: 2002     Years since quittin.3    Smokeless tobacco: Never Used   Substance and Sexual Activity    Alcohol use: No    Drug use: No    Sexual activity: Yes     Partners: Male   Other Topics Concern    Not on file   Social History Narrative    Not on file     Social Determinants of Health     Financial Resource Strain:     Difficulty of Paying Living Expenses:    Food Insecurity:     Worried About Running Out of Food in the Last Year:     Guido of Food in the Last Year:    Transportation Needs:     Lack of Transportation (Medical):  Lack of Transportation (Non-Medical):    Physical Activity:     Days of Exercise per Week:     Minutes of Exercise per Session:    Stress:     Feeling of Stress :    Social Connections:     Frequency of Communication with Friends and Family:     Frequency of Social Gatherings with Friends and Family:     Attends Mu-ism Services:     Active Member of Clubs or Organizations:     Attends Club or Organization Meetings:     Marital Status:    Intimate Partner Violence:     Fear of Current or Ex-Partner:     Emotionally Abused:     Physically Abused:     Sexually Abused:      No current facility-administered medications for this encounter. Current Outpatient Medications   Medication Sig Dispense Refill    sertraline (ZOLOFT) 50 MG tablet TAKE 1 TABLET BY MOUTH EVERY DAY 30 tablet 1    hydrOXYzine (ATARAX) 10 MG tablet TAKE 1 TABLET BY MOUTH THREE TIMES A DAY AS NEEDED FOR ANXIETY 90 tablet 1    Multiple Vitamins-Minerals (THERAPEUTIC MULTIVITAMIN-MINERALS) tablet Take 1 tablet by mouth daily      APRI 0.15-30 MG-MCG per tablet Take 1 tablet by mouth daily        No Known Allergies  No current facility-administered medications for this encounter.      Current Outpatient Medications   Medication Sig Dispense Refill    sertraline (ZOLOFT) 50 MG tablet TAKE 1 TABLET BY MOUTH EVERY DAY 30 tablet 1    hydrOXYzine (ATARAX) 10 MG tablet TAKE 1 TABLET BY MOUTH THREE TIMES A DAY AS NEEDED FOR ANXIETY 90 tablet 1    Multiple Vitamins-Minerals (THERAPEUTIC MULTIVITAMIN-MINERALS) tablet Take 1 tablet by mouth daily      APRI 0.15-30 MG-MCG per tablet Take 1 tablet by mouth daily         Nursing Notes Reviewed    VITAL SIGNS:  ED Triage Vitals [05/23/21 0128]   Enc Vitals Group      BP       Pulse (!) 46      Resp       Temp       Temp src       SpO2 Weight       Height       Head Circumference       Peak Flow       Pain Score       Pain Loc       Pain Edu? Excl. in [address]? PHYSICAL EXAM:  Physical Exam  Vitals and nursing note reviewed. Constitutional:       General: She is in acute distress. Appearance: She is well-developed. She is ill-appearing. She is not diaphoretic. Interventions: She is intubated. Backboard in place. HENT:      Head: Normocephalic and atraumatic. Right Ear: External ear normal.      Left Ear: External ear normal.      Mouth/Throat:      Comments: Emesis in airway  Eyes:      Comments: Pupils fixed and dilated bilaterally   Neck:      Vascular: No JVD. Cardiovascular:      Comments: asystole  Pulmonary:      Effort: She is intubated. Abdominal:      General: There is no distension. Palpations: Abdomen is soft. There is no mass. Tenderness: There is no abdominal tenderness. There is no guarding or rebound. Negative signs include Basurto's sign, Rovsing's sign and McBurney's sign. Hernia: No hernia is present. Musculoskeletal:         General: No swelling, deformity or signs of injury. Cervical back: No rigidity. Right lower leg: No edema. Left lower leg: No edema. Neurological:      Mental Status: She is unresponsive. GCS: GCS eye subscore is 1. GCS verbal subscore is 1. GCS motor subscore is 1. I have reviewed andinterpreted all of the currently available lab results from this visit (if applicable):    No results found for this visit on 05/23/21. Radiographs (if obtained):  [] The following radiograph was interpreted by myself in the absence of a radiologist:  [x] Radiologist's Report Reviewed:      EKG (if obtained): (All EKG's are interpreted by myself in the absence of a cardiologist)    12 lead EKG per my interpretation:  Ventricular Sarkis 40 STEMI  Axis is   Normal  QTc is  352  There is no specific T wave changes appreciated.   There is specific ST wave changes appreciated. Elevation 2 3 aVF V4 V5 V6, depression AVL     Prior EKG to compare with was not available     Procedure Note - Intubation:  Patient emergently intubated due to cardiac arrest pre-oxygenation was administered and the appropriate equipment and staff were made available at the bedside. Enrique Lovelace was not sedated/paralyzed; please see the chart for the drugs and dosages administered. A Nikita 3 laryngoscope blade was used. Video laryngoscopy WAS/WAS NOT: was used. A 7.5mm endotracheal tube was viewed to pass through the cords on the first attempt. The tube was secured at 23cm to the lip. Tracheal position was confirmed using a colorimetric end-tidal CO2 detector, tube condensation and chest auscultation/visualization. Respiratory therapy is at the bedside and is assisting with ventilatory management. Post procedure chest xray was ordered. Total critical care time today provided was at least 35 minutes. This excludes seperately billable procedure. Critical care time provided for reviewing labs, images giving oxygen CPR amiodarone epinephrine atropine bicarb calcium consulting family defibrillation that required close evaluation and/or intervention with concern for patient decompensation. MDM:    Patient here in cardiac arrest.  Again before arrival she apparently was using OxyContin and drinking alcohol with family she apparently went apneic and started having emesis. Family member started CPR immediately. EMS called found patient unresponsive started CPR put a Gallito Crenshaw airway in the patient brought her for evaluation. Patient's been down 30 minutes prior to my involvement. She arrives in cardiac arrest CPR in progress patient had pulse check rhythm check asystole CPR continued. On arrival she does have fixed and dilated pupils bilaterally and also emesis in her face nose hair airway. I did perform suction removed Adam airway intubated patient successfully.   Emesis also coming through ET tube. CPR continued given multiple rounds of epinephrine, bicarb, calcium, magnesium. She did have a few runs of V fibrillation which we did have to cardiovert several times. Eventually she did go to a bradycardic ventricular rhythm given atropine and epi as well and did order Levophed and epinephrine drip. Unfortunately despite all efforts patient could not get a heartbeat back. She was in PEA at the very end. She has been down for least 40 to 45 minutes at this time I did consult family in the consultation room and brought him back at patient's bedside. Despite all efforts unsuccessful in nature. Also given multiple rounds of Narcan prior to tablet has been called. Unfortunately despite agonal efforts patient  at 1:47 AM.  Family at bedside  called. Time of death 1:47 AM    CLINICAL IMPRESSION:  Final diagnoses:   Cardiac arrest ([facility] [address]       (Please note that portions of this note may have been completed with a voice recognition program. Efforts were made to edit the dictations but occasionally words aremis-transcribed.)    DISPOSITION REFERRAL (if applicable):  No follow-up provider specified.     DISPOSITION MEDICATIONS (if applicable):  Discharge Medication List as of 2021  4:04 AM             Nicole Kim, DO           Nicole Kim, DO  [phone]

## 2021-05-23 NOTE — ED NOTES
0146-Josi was called (he is heading in)  0153- This tech called the  and spoke with [phone] Garett Bob- This tech called [facility] and spoke with Liana Francis (states that the patient is likely able to donate) Case [id number] Argue from the 's office called and stated this is a coroners case     Chi Durbin  05/23/21 0214

## 2021-05-23 NOTE — ED NOTES
Bed: 01TR-01  Expected date:   Expected time:   Means of arrival:   Comments:  Full arrest     Laurice Angelucci, RN  05/23/21 [id number]

## 2021-05-24 LAB
EKG ATRIAL RATE: 47 BPM
EKG DIAGNOSIS: NORMAL
EKG Q-T INTERVAL: 432 MS
EKG QRS DURATION: 148 MS
EKG QTC CALCULATION (BAZETT): 352 MS
EKG R AXIS: 80 DEGREES
EKG T AXIS: 105 DEGREES
EKG VENTRICULAR RATE: 40 BPM

## 2021-05-25 ENCOUNTER — CLINICAL DOCUMENTATION (OUTPATIENT)
Dept: ONCOLOGY | Age: 41
End: 2021-05-25

## 2021-05-27 LAB
BCR/ABL T(9,22): NORMAL
CALR MUTATION: NORMAL
COMMENT: NORMAL
JAK2 EXONS 12-15 MUTATION: NORMAL
JAK2 GENE MUTATION QUAL: NORMAL
MPL 515 MUTATION: NORMAL

## 2021-06-10 RX ORDER — HYDROXYZINE HYDROCHLORIDE 10 MG/1
TABLET, FILM COATED ORAL
Qty: 90 TABLET | Refills: 1 | OUTPATIENT
Start: 2021-06-10